# Patient Record
Sex: MALE | Race: WHITE
[De-identification: names, ages, dates, MRNs, and addresses within clinical notes are randomized per-mention and may not be internally consistent; named-entity substitution may affect disease eponyms.]

---

## 2018-04-13 ENCOUNTER — HOSPITAL ENCOUNTER (EMERGENCY)
Dept: HOSPITAL 19 - COL.ER | Age: 23
Discharge: HOME | End: 2018-04-13
Payer: COMMERCIAL

## 2018-04-13 VITALS — WEIGHT: 180.34 LBS | HEIGHT: 72.01 IN | BODY MASS INDEX: 24.43 KG/M2

## 2018-04-13 VITALS — DIASTOLIC BLOOD PRESSURE: 85 MMHG | TEMPERATURE: 98 F | SYSTOLIC BLOOD PRESSURE: 132 MMHG

## 2018-04-13 VITALS — HEART RATE: 66 BPM
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
## 2018-04-13 DIAGNOSIS — Z23: ICD-10-CM

## 2018-04-13 DIAGNOSIS — S01.81XA: Primary | ICD-10-CM

## 2018-04-19 ENCOUNTER — HOSPITAL ENCOUNTER (EMERGENCY)
Dept: HOSPITAL 19 - COL.ER | Age: 23
Discharge: HOME | End: 2018-04-19
Payer: COMMERCIAL

## 2018-04-19 VITALS — TEMPERATURE: 98.4 F | DIASTOLIC BLOOD PRESSURE: 73 MMHG | HEART RATE: 88 BPM | SYSTOLIC BLOOD PRESSURE: 122 MMHG
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
## 2018-04-19 DIAGNOSIS — S01.81XD: Primary | ICD-10-CM

## 2021-04-19 ENCOUNTER — APPOINTMENT (RX ONLY)
Dept: URBAN - METROPOLITAN AREA CLINIC 76 | Facility: CLINIC | Age: 26
Setting detail: DERMATOLOGY
End: 2021-04-19

## 2021-04-19 PROBLEM — L67.8 OTHER HAIR COLOR AND HAIR SHAFT ABNORMALITIES: Status: ACTIVE | Noted: 2021-04-19

## 2021-04-19 PROCEDURE — 99202 OFFICE O/P NEW SF 15 MIN: CPT

## 2021-04-19 PROCEDURE — ? TREATMENT REGIMEN

## 2021-04-19 PROCEDURE — ? ADDITIONAL NOTES

## 2021-04-19 NOTE — HPI: HAIR LOSS
Previous Labs: No
How Did The Hair Loss Occur?: sudden in onset
How Severe Is Your Hair Loss?: moderate
What Hair Products Do You Use?: Head and Shoulders
Additional History: Patient states he has noticed a loss of volume in his hair.  He has not particularly noticed any hair loss or shedding.  He is unsure if he’s having early hair loss or his hair is just changing.  He is styling it different that he used to due to Covid-19. It is much longer than it used to be.

## 2021-04-19 NOTE — PROCEDURE: ADDITIONAL NOTES
Render Risk Assessment In Note?: no
Detail Level: Generalized
Additional Notes: We had a long conversation regarding hair loss and expectations.  It is impossible without a biopsy at this time to identify if patient is having early male pattern baldness or not as hair appears normal and patient has not noticed any lost, just textural changes.  Will do trial of Rogaine.